# Patient Record
Sex: FEMALE | Race: WHITE | NOT HISPANIC OR LATINO | Employment: UNEMPLOYED | URBAN - METROPOLITAN AREA
[De-identification: names, ages, dates, MRNs, and addresses within clinical notes are randomized per-mention and may not be internally consistent; named-entity substitution may affect disease eponyms.]

---

## 2017-01-17 ENCOUNTER — ALLSCRIPTS OFFICE VISIT (OUTPATIENT)
Dept: OTHER | Facility: OTHER | Age: 37
End: 2017-01-17

## 2017-01-18 ENCOUNTER — LAB REQUISITION (OUTPATIENT)
Dept: LAB | Facility: HOSPITAL | Age: 37
End: 2017-01-18

## 2017-01-18 DIAGNOSIS — N76.6 ULCERATION OF VULVA: ICD-10-CM

## 2017-01-18 PROCEDURE — 87255 GENET VIRUS ISOLATE HSV: CPT | Performed by: NURSE PRACTITIONER

## 2017-01-18 PROCEDURE — 86694 HERPES SIMPLEX NES ANTBDY: CPT | Performed by: NURSE PRACTITIONER

## 2017-01-18 PROCEDURE — 86696 HERPES SIMPLEX TYPE 2 TEST: CPT | Performed by: NURSE PRACTITIONER

## 2017-01-18 PROCEDURE — 86695 HERPES SIMPLEX TYPE 1 TEST: CPT | Performed by: NURSE PRACTITIONER

## 2017-01-19 LAB
HSV1 IGG SER IA-ACNC: 50.1 INDEX (ref 0–0.9)
HSV1+2 IGM SER IA-ACNC: 3.54 RATIO (ref 0–0.9)
HSV2 IGG SER IA-ACNC: 10.5 INDEX (ref 0–0.9)

## 2017-01-20 LAB — HSV SPEC CULT: NORMAL

## 2017-01-31 ENCOUNTER — GENERIC CONVERSION - ENCOUNTER (OUTPATIENT)
Dept: OTHER | Facility: OTHER | Age: 37
End: 2017-01-31

## 2017-04-18 ENCOUNTER — ALLSCRIPTS OFFICE VISIT (OUTPATIENT)
Dept: OTHER | Facility: OTHER | Age: 37
End: 2017-04-18

## 2018-01-11 NOTE — PROGRESS NOTES
Assessment    1  Vulvar ulcer (856 50) (N76 6)    Plan  Vulvar ulcer    · ValACYclovir HCl - 1 GM Oral Tablet (Valtrex); TAKE 1 TABLET EVERY 12 HOURS   Rx By: Sarah Guillen; Dispense: 7 Days ; #:14 Tablet; Refill: 0; For: Vulvar ulcer; SOLE = N; Verified Transmission to [facility]; Last Updated By: System, [employer]; 1/17/2017 4:05:05 PM    Discussion/Summary  Discussion Summary:   Vulva ulceration cultured for hsv  rx for valtrex sent to pharmacy  blood drawn for hsv igg, igm   pt to be called when results available    rec  keep area clean, dry and tamika as much as possible  warm sitz's baths and advil prn pain relief  Has hx of surgery for abscesses; pt informed this is not an abscess now and will discuss in more detail when results available  all questions answered  call me if pain worsens or other sxs develop  GYN Discussion and Summary:       Herpes Simplex, Genital--  Chief Complaint  Chief Complaint Free Text Note Form: Pimple on groin area      History of Present Illness  HPI: Noticed painful area on vulva 3 days ago  In monog  relationship w  for 20+ years  hx of surgery for recurrent abscesses in the groin 10 years ago and no problems since until noticed this  States "not as bad as usual, wants to catch it early if needs antibiotic "  Vulvar Pain:   Robby Redman presents with complaints of gradual onset of moderate left vulvar pain, described as burning and stinging, non-radiating  Associated symptoms include vulvar soreness, vulvar irritation and vulvar tenderness, but no vulvar pruritus, no vulvar erythema, no vaginal discharge, no vaginal pain, no urethral discharge, no urinary changes, no dyspareunia, no abdominal pain, no bowel changes, no dysmenorrhea and no myalgias     The patient presents with complaints of vulvar lesions (3 small ulcerations seen on left labia majora that are c/w hsv  )      Review of Systems  Focused-Female: Constitutional: No fever, no chills, feels well, no tiredness, no recent weight gain or loss  ENT: no ear ache, no loss of hearing, no nosebleeds or nasal discharge, no sore throat or hoarseness  Cardiovascular: no complaints of slow or fast heart rate, no chest pain, no palpitations, no leg claudication or lower extremity edema  Respiratory: no complaints of shortness of breath, no wheezing, no dyspnea on exertion, no orthopnea or PND  Breasts: no complaints of breast pain, breast lump or nipple discharge  Gastrointestinal: no complaints of abdominal pain, no constipation, no nausea or diarrhea, no vomiting, no bloody stools  Genitourinary: no complaints of dysuria, no incontinence, no pelvic pain, no dysmenorrhea, no vaginal discharge or abnormal vaginal bleeding and as noted in HPI  Musculoskeletal: no complaints of arthralgia, no myalgia, no joint swelling or stiffness, no limb pain or swelling  Integumentary: no complaints of skin rash or lesion, no itching or dry skin, no skin wounds  Neurological: no complaints of headache, no confusion, no numbness or tingling, no dizziness or fainting  ROS Reviewed:   ROS reviewed  Active Problems    1  Encounter for routine gynecological examination (V72 31) (Z01 419)   2  Pain, female pelvic (625 9) (R10 2)    Past Medical History  Active Problems And Past Medical History Reviewed: The active problems and past medical history were reviewed and updated today  Surgical History  Surgical History Reviewed: The surgical history was reviewed and updated today  Family History  Family History Reviewed: The family history was reviewed and updated today  Social History  Social History Reviewed: The social history was reviewed and updated today  The social history was reviewed and is unchanged  Current Meds  Medication List Reviewed: The medication list was reviewed and updated today  Allergies    1   No Known Drug Allergies    Vitals  Vital Signs    Recorded: [id number] 89:81GR   Systolic 169   Diastolic 68   Weight 022 lb 3 04 oz     Physical Exam    Constitutional   General appearance: No acute distress, well appearing and well nourished  Pulmonary   Respiratory effort: No increased work of breathing or signs of respiratory distress  Genitourinary   External genitalia: Abnormal   (3 small ulceration c/w w hsv on left labia majora)   Urethra: Normal     Urethral meatus: Normal     Psychiatric   Orientation to person, place, and time: Normal     Mood and affect: Normal        Signatures   Electronically signed by :  Mindy Mercado; Jan 17 2017  4:18PM EST                       (Author)    Electronically signed by : LANDEN Koehler ; Jan 19 2017  9:46AM EST

## 2018-01-13 VITALS — DIASTOLIC BLOOD PRESSURE: 68 MMHG | SYSTOLIC BLOOD PRESSURE: 118 MMHG | WEIGHT: 187.19 LBS

## 2018-01-14 VITALS
WEIGHT: 192.13 LBS | SYSTOLIC BLOOD PRESSURE: 114 MMHG | DIASTOLIC BLOOD PRESSURE: 68 MMHG | HEIGHT: 69 IN | BODY MASS INDEX: 28.46 KG/M2

## 2018-01-16 NOTE — MISCELLANEOUS
Message   Recorded as Task   Date: 01/24/2017 09:00 AM, Created By: Leno Hurtado   Task Name: Follow Up   Assigned To: Bo Thurman   Regarding Patient: Fede Diaz, Status: Active   Comment:    Apurva Garcia - 24 Jan 2017 9:00 AM     TASK CREATED  pl call and tell her:      labs were + for type 1 and type 2 hsv, which means her body has been exposed to both of them  her culture was negative, but not unexpected b/c I didn't get much fluid from the area  If she ever gets a sore like she did before, come in when it is present, or looks like a blister so it can be cultured and then they can identify which type it is  We will make a apt for her to be seen the day she has the lesion  thanks   Bo Thurman - 25 Jan 2017 11:06 AM     TASK REPLIED TO: Previously Assigned To [facility],Team                      tried calling pt but number on file was not accepting phone calls  will try another time   Leydi Hernandez - 30 Jan 2017 4:44 PM     TASK REASSIGNED: Previously Assigned To [facility],Team  number on file still not in service  will send her a letter   Please see task below      Active Problems    1  Encounter for routine gynecological examination (V72 31) (Z01 419)   2  Pain, female pelvic (625 9) (R10 2)   3  Vulvar ulcer (616 50) (N76 6)    Current Meds   1  ValACYclovir HCl - 1 GM Oral Tablet; TAKE 1 TABLET EVERY 12 HOURS; Therapy: [id number] to (Evaluate:24Jan2017)  Requested for: [id number]; Last   Rx:17Jan2017 Ordered    Allergies    1  No Known Drug Allergies    Signatures   Electronically signed by :  PAPI Urena; Feb 1 2017 12:59PM EST                       (Author)

## 2021-01-09 ENCOUNTER — IMMUNIZATIONS (OUTPATIENT)
Dept: FAMILY MEDICINE CLINIC | Facility: HOSPITAL | Age: 41
End: 2021-01-09

## 2021-01-09 DIAGNOSIS — Z23 ENCOUNTER FOR IMMUNIZATION: ICD-10-CM

## 2021-01-09 PROCEDURE — 91301 SARS-COV-2 / COVID-19 MRNA VACCINE (MODERNA) 100 MCG: CPT

## 2021-01-09 PROCEDURE — 0011A SARS-COV-2 / COVID-19 MRNA VACCINE (MODERNA) 100 MCG: CPT

## 2021-02-04 ENCOUNTER — IMMUNIZATIONS (OUTPATIENT)
Dept: FAMILY MEDICINE CLINIC | Facility: HOSPITAL | Age: 41
End: 2021-02-04

## 2021-02-04 DIAGNOSIS — Z23 ENCOUNTER FOR IMMUNIZATION: Primary | ICD-10-CM

## 2021-02-04 PROCEDURE — 0012A SARS-COV-2 / COVID-19 MRNA VACCINE (MODERNA) 100 MCG: CPT

## 2021-02-04 PROCEDURE — 91301 SARS-COV-2 / COVID-19 MRNA VACCINE (MODERNA) 100 MCG: CPT

## 2021-08-07 ENCOUNTER — APPOINTMENT (EMERGENCY)
Dept: CT IMAGING | Facility: HOSPITAL | Age: 41
End: 2021-08-07
Payer: COMMERCIAL

## 2021-08-07 ENCOUNTER — HOSPITAL ENCOUNTER (EMERGENCY)
Facility: HOSPITAL | Age: 41
Discharge: HOME | End: 2021-08-08
Attending: EMERGENCY MEDICINE | Admitting: EMERGENCY MEDICINE
Payer: COMMERCIAL

## 2021-08-07 DIAGNOSIS — N12 PYELONEPHRITIS: Primary | ICD-10-CM

## 2021-08-07 LAB
ALBUMIN SERPL BCP-MCNC: 3.7 G/DL (ref 3.5–5)
ALP SERPL-CCNC: 46 U/L (ref 46–116)
ALT SERPL W P-5'-P-CCNC: 25 U/L (ref 12–78)
ANION GAP SERPL CALCULATED.3IONS-SCNC: 10 MMOL/L (ref 4–13)
APTT PPP: 30 SECONDS (ref 23–37)
AST SERPL W P-5'-P-CCNC: 15 U/L (ref 5–45)
BACTERIA UR QL AUTO: ABNORMAL /HPF
BASOPHILS # BLD AUTO: 0.03 THOUSANDS/ΜL (ref 0–0.1)
BASOPHILS NFR BLD AUTO: 0 % (ref 0–1)
BILIRUB SERPL-MCNC: 0.28 MG/DL (ref 0.2–1)
BILIRUB UR QL STRIP: NEGATIVE
BUN SERPL-MCNC: 12 MG/DL (ref 5–25)
CALCIUM SERPL-MCNC: 8.6 MG/DL (ref 8.3–10.1)
CHLORIDE SERPL-SCNC: 103 MMOL/L (ref 100–108)
CLARITY UR: ABNORMAL
CO2 SERPL-SCNC: 28 MMOL/L (ref 21–32)
COLOR UR: YELLOW
CREAT SERPL-MCNC: 0.92 MG/DL (ref 0.6–1.3)
EOSINOPHIL # BLD AUTO: 0.05 THOUSAND/ΜL (ref 0–0.61)
EOSINOPHIL NFR BLD AUTO: 1 % (ref 0–6)
ERYTHROCYTE [DISTWIDTH] IN BLOOD BY AUTOMATED COUNT: 13 % (ref 11.6–15.1)
EXT PREG TEST URINE: NEGATIVE
EXT. CONTROL ED NAV: NORMAL
GFR SERPL CREATININE-BSD FRML MDRD: 77 ML/MIN/1.73SQ M
GLUCOSE SERPL-MCNC: 91 MG/DL (ref 65–140)
GLUCOSE UR STRIP-MCNC: NEGATIVE MG/DL
HCT VFR BLD AUTO: 37.7 % (ref 34.8–46.1)
HGB BLD-MCNC: 12.8 G/DL (ref 11.5–15.4)
HGB UR QL STRIP.AUTO: ABNORMAL
IMM GRANULOCYTES # BLD AUTO: 0.03 THOUSAND/UL (ref 0–0.2)
IMM GRANULOCYTES NFR BLD AUTO: 0 % (ref 0–2)
INR PPP: 1.05 (ref 0.84–1.19)
KETONES UR STRIP-MCNC: NEGATIVE MG/DL
LACTATE SERPL-SCNC: 1 MMOL/L (ref 0.5–2)
LEUKOCYTE ESTERASE UR QL STRIP: ABNORMAL
LIPASE SERPL-CCNC: 90 U/L (ref 73–393)
LYMPHOCYTES # BLD AUTO: 0.68 THOUSANDS/ΜL (ref 0.6–4.47)
LYMPHOCYTES NFR BLD AUTO: 9 % (ref 14–44)
MCH RBC QN AUTO: 30.8 PG (ref 26.8–34.3)
MCHC RBC AUTO-ENTMCNC: 34 G/DL (ref 31.4–37.4)
MCV RBC AUTO: 91 FL (ref 82–98)
MONOCYTES # BLD AUTO: 0.57 THOUSAND/ΜL (ref 0.17–1.22)
MONOCYTES NFR BLD AUTO: 7 % (ref 4–12)
NEUTROPHILS # BLD AUTO: 6.32 THOUSANDS/ΜL (ref 1.85–7.62)
NEUTS SEG NFR BLD AUTO: 83 % (ref 43–75)
NITRITE UR QL STRIP: NEGATIVE
NON-SQ EPI CELLS URNS QL MICRO: ABNORMAL /HPF
NRBC BLD AUTO-RTO: 0 /100 WBCS
PH UR STRIP.AUTO: 6.5 [PH] (ref 4.5–8)
PLATELET # BLD AUTO: 244 THOUSANDS/UL (ref 149–390)
PMV BLD AUTO: 10 FL (ref 8.9–12.7)
POTASSIUM SERPL-SCNC: 3.7 MMOL/L (ref 3.5–5.3)
PROT SERPL-MCNC: 7 G/DL (ref 6.4–8.2)
PROT UR STRIP-MCNC: ABNORMAL MG/DL
PROTHROMBIN TIME: 13.5 SECONDS (ref 11.6–14.5)
RBC # BLD AUTO: 4.16 MILLION/UL (ref 3.81–5.12)
RBC #/AREA URNS AUTO: ABNORMAL /HPF
SODIUM SERPL-SCNC: 141 MMOL/L (ref 136–145)
SP GR UR STRIP.AUTO: 1.01 (ref 1–1.03)
UROBILINOGEN UR QL STRIP.AUTO: 0.2 E.U./DL
WBC # BLD AUTO: 7.68 THOUSAND/UL (ref 4.31–10.16)
WBC #/AREA URNS AUTO: ABNORMAL /HPF

## 2021-08-07 PROCEDURE — 96365 THER/PROPH/DIAG IV INF INIT: CPT

## 2021-08-07 PROCEDURE — 87040 BLOOD CULTURE FOR BACTERIA: CPT | Performed by: EMERGENCY MEDICINE

## 2021-08-07 PROCEDURE — 81001 URINALYSIS AUTO W/SCOPE: CPT

## 2021-08-07 PROCEDURE — 96375 TX/PRO/DX INJ NEW DRUG ADDON: CPT

## 2021-08-07 PROCEDURE — 87186 SC STD MICRODIL/AGAR DIL: CPT

## 2021-08-07 PROCEDURE — 83605 ASSAY OF LACTIC ACID: CPT | Performed by: EMERGENCY MEDICINE

## 2021-08-07 PROCEDURE — 87077 CULTURE AEROBIC IDENTIFY: CPT

## 2021-08-07 PROCEDURE — 74177 CT ABD & PELVIS W/CONTRAST: CPT

## 2021-08-07 PROCEDURE — 87086 URINE CULTURE/COLONY COUNT: CPT

## 2021-08-07 PROCEDURE — 96366 THER/PROPH/DIAG IV INF ADDON: CPT

## 2021-08-07 PROCEDURE — 80053 COMPREHEN METABOLIC PANEL: CPT | Performed by: EMERGENCY MEDICINE

## 2021-08-07 PROCEDURE — 81025 URINE PREGNANCY TEST: CPT | Performed by: EMERGENCY MEDICINE

## 2021-08-07 PROCEDURE — 99284 EMERGENCY DEPT VISIT MOD MDM: CPT | Performed by: EMERGENCY MEDICINE

## 2021-08-07 PROCEDURE — 84145 PROCALCITONIN (PCT): CPT | Performed by: EMERGENCY MEDICINE

## 2021-08-07 PROCEDURE — 85025 COMPLETE CBC W/AUTO DIFF WBC: CPT | Performed by: EMERGENCY MEDICINE

## 2021-08-07 PROCEDURE — 36415 COLL VENOUS BLD VENIPUNCTURE: CPT | Performed by: EMERGENCY MEDICINE

## 2021-08-07 PROCEDURE — 85730 THROMBOPLASTIN TIME PARTIAL: CPT | Performed by: EMERGENCY MEDICINE

## 2021-08-07 PROCEDURE — 83690 ASSAY OF LIPASE: CPT | Performed by: EMERGENCY MEDICINE

## 2021-08-07 PROCEDURE — 85610 PROTHROMBIN TIME: CPT | Performed by: EMERGENCY MEDICINE

## 2021-08-07 PROCEDURE — 99284 EMERGENCY DEPT VISIT MOD MDM: CPT

## 2021-08-07 RX ORDER — KETOROLAC TROMETHAMINE 30 MG/ML
15 INJECTION, SOLUTION INTRAMUSCULAR; INTRAVENOUS ONCE
Status: COMPLETED | OUTPATIENT
Start: 2021-08-07 | End: 2021-08-07

## 2021-08-07 RX ADMIN — SODIUM CHLORIDE, SODIUM LACTATE, POTASSIUM CHLORIDE, AND CALCIUM CHLORIDE 2000 ML: .6; .31; .03; .02 INJECTION, SOLUTION INTRAVENOUS at 21:53

## 2021-08-07 RX ADMIN — KETOROLAC TROMETHAMINE 15 MG: 30 INJECTION, SOLUTION INTRAMUSCULAR; INTRAVENOUS at 21:57

## 2021-08-07 RX ADMIN — IOHEXOL 100 ML: 350 INJECTION, SOLUTION INTRAVENOUS at 22:54

## 2021-08-08 VITALS
HEART RATE: 84 BPM | RESPIRATION RATE: 16 BRPM | WEIGHT: 173.28 LBS | TEMPERATURE: 99 F | OXYGEN SATURATION: 100 % | BODY MASS INDEX: 25.59 KG/M2 | DIASTOLIC BLOOD PRESSURE: 62 MMHG | SYSTOLIC BLOOD PRESSURE: 102 MMHG

## 2021-08-08 LAB — PROCALCITONIN SERPL-MCNC: 0.12 NG/ML

## 2021-08-08 PROCEDURE — 96367 TX/PROPH/DG ADDL SEQ IV INF: CPT

## 2021-08-08 RX ORDER — SULFAMETHOXAZOLE AND TRIMETHOPRIM 800; 160 MG/1; MG/1
1 TABLET ORAL 2 TIMES DAILY
Qty: 20 TABLET | Refills: 0 | Status: SHIPPED | OUTPATIENT
Start: 2021-08-08 | End: 2021-08-18

## 2021-08-08 RX ADMIN — CEFTRIAXONE SODIUM 1000 MG: 10 INJECTION, POWDER, FOR SOLUTION INTRAVENOUS at 00:07

## 2021-08-08 NOTE — ED PROVIDER NOTES
History  Chief Complaint   Patient presents with    Flank Pain     Reports L sided flank pain since Wednesday  Cloudy urine x 2 weeks  + chills  This is a 23-year-old female with a history of migraines who presents with flank pain  Over the past couple of weeks, the patient has noticed cloudy urine  Patient states that she started taking a lot of vitamins, so believes that the cloudy urine was due to the vitamins  However, starting on Wednesday, the patient has noticed bilateral flank pain which has been constant, nonradiating associated with chills  She has been taking Motrin since yesterday  Last took Motrin at around 5:30 p m  Vikas Irvin Denies any sick contacts  Does admit to urinary frequency, but states that this is typical for her  Denies any history of kidney stones, gallstones, alcohol abuse  No history of abdominal surgeries  Denies fever, nausea/vomiting, lightheadedness/dizziness, numbness/weakness, headache, change in vision, URI symptoms, neck pain, chest pain, palpitations, shortness of breath, cough, back pain, abdominal pain, diarrhea, hematochezia, melena, dysuria, hematuria, abnormal vaginal discharge/bleeding  None       History reviewed  No pertinent past medical history  History reviewed  No pertinent surgical history  History reviewed  No pertinent family history  I have reviewed and agree with the history as documented  E-Cigarette/Vaping     E-Cigarette/Vaping Substances     Social History     Tobacco Use    Smoking status: Never Smoker    Smokeless tobacco: Never Used   Substance Use Topics    Alcohol use: Never    Drug use: Not on file       Review of Systems   Constitutional: Positive for chills  Negative for fever  HENT: Negative for rhinorrhea, sore throat and trouble swallowing  Eyes: Negative for photophobia and visual disturbance  Respiratory: Negative for cough, chest tightness and shortness of breath      Cardiovascular: Negative for chest pain, palpitations and leg swelling  Gastrointestinal: Negative for abdominal pain, blood in stool, diarrhea, nausea and vomiting  Endocrine: Negative for polyuria  Genitourinary: Positive for flank pain  Negative for dysuria, hematuria, vaginal bleeding and vaginal discharge  Musculoskeletal: Negative for back pain and neck pain  Skin: Negative for color change and rash  Allergic/Immunologic: Negative for immunocompromised state  Neurological: Negative for dizziness, weakness, light-headedness, numbness and headaches  All other systems reviewed and are negative  Physical Exam  Physical Exam  Constitutional:       General: She is not in acute distress  Appearance: Normal appearance  She is well-developed  HENT:      Mouth/Throat:      Pharynx: Uvula midline  Eyes:      Conjunctiva/sclera: Conjunctivae normal       Pupils: Pupils are equal, round, and reactive to light  Neck:      Thyroid: No thyroid mass or thyromegaly  Trachea: Trachea normal    Cardiovascular:      Rate and Rhythm: Regular rhythm  Tachycardia present  Heart sounds: Normal heart sounds  No murmur heard  Pulmonary:      Effort: Pulmonary effort is normal       Breath sounds: Normal breath sounds  Abdominal:      General: Bowel sounds are normal       Palpations: Abdomen is soft  Tenderness: There is abdominal tenderness in the right upper quadrant  There is no guarding or rebound  Skin:     General: Skin is warm and dry  Neurological:      Mental Status: She is alert  Psychiatric:         Speech: Speech normal          Behavior: Behavior normal  Behavior is cooperative  Thought Content:  Thought content normal          Vital Signs  ED Triage Vitals [08/07/21 2120]   Temperature Pulse Respirations Blood Pressure SpO2   99 °F (37 2 °C) (!) 125 20 117/72 97 %      Temp Source Heart Rate Source Patient Position - Orthostatic VS BP Location FiO2 (%)   Oral Monitor Sitting Right arm --      Pain Score       --           Vitals:    08/07/21 2120 08/07/21 2300 08/07/21 2345 08/08/21 0015   BP: 117/72  104/69 102/62   Pulse: (!) 125 92 86 84   Patient Position - Orthostatic VS: Sitting  Lying Lying         Visual Acuity      ED Medications  Medications   lactated ringers bolus 2,000 mL (0 mL Intravenous Stopped 8/8/21 0005)   ketorolac (TORADOL) injection 15 mg (15 mg Intravenous Given 8/7/21 2157)   iohexol (OMNIPAQUE) 350 MG/ML injection (SINGLE-DOSE) 100 mL (100 mL Intravenous Given 8/7/21 2254)   ceftriaxone (ROCEPHIN) 1 g/50 mL in dextrose IVPB (0 mg Intravenous Stopped 8/8/21 0102)       Diagnostic Studies  Results Reviewed     Procedure Component Value Units Date/Time    Comprehensive metabolic panel [[id number]] Collected: 08/07/21 2147    Lab Status: Final result Specimen: Blood from Arm, Right Updated: 08/07/21 2225     Sodium 141 mmol/L      Potassium 3 7 mmol/L      Chloride 103 mmol/L      CO2 28 mmol/L      ANION GAP 10 mmol/L      BUN 12 mg/dL      Creatinine 0 92 mg/dL      Glucose 91 mg/dL      Calcium 8 6 mg/dL      AST 15 U/L      ALT 25 U/L      Alkaline Phosphatase 46 U/L      Total Protein 7 0 g/dL      Albumin 3 7 g/dL      Total Bilirubin 0 28 mg/dL      eGFR 77 ml/min/1 73sq m     Narrative:      [place] guidelines for Chronic Kidney Disease (CKD):     Stage 1 with normal or high GFR (GFR > 90 mL/min/1 73 square meters)    Stage 2 Mild CKD (GFR = 60-89 mL/min/1 73 square meters)    Stage 3A Moderate CKD (GFR = 45-59 mL/min/1 73 square meters)    Stage 3B Moderate CKD (GFR = 30-44 mL/min/1 73 square meters)    Stage 4 Severe CKD (GFR = 15-29 mL/min/1 73 square meters)    Stage 5 End Stage CKD (GFR <15 mL/min/1 73 square meters)  Note: GFR calculation is accurate only with a steady state creatinine    Lipase [[id number]]  (Normal) Collected: 08/07/21 2147    Lab Status: Final result Specimen: Blood from Arm, Right Updated: 08/07/21 2225     Lipase 90 u/L Urine Microscopic [[id number]]  (Abnormal) Collected: 08/07/21 2150    Lab Status: Final result Specimen: Urine, Clean Catch Updated: 08/07/21 2218     RBC, UA 4-10 /hpf      WBC, UA Innumerable /hpf      Epithelial Cells Occasional /hpf      Bacteria, UA Occasional /hpf     Urine culture [[id number]] Collected: 08/07/21 2150    Lab Status: In process Specimen: Urine, Clean Catch Updated: 08/07/21 2217    Lactic Acid [[id number]]  (Normal) Collected: 08/07/21 2150    Lab Status: Final result Specimen: Blood from Arm, Left Updated: 08/07/21 2216     LACTIC ACID 1 0 mmol/L     Narrative:      Result may be elevated if tourniquet was used during collection  Protime-INR [[id number]]  (Normal) Collected: 08/07/21 2146    Lab Status: Final result Specimen: Blood from Arm, Right Updated: 08/07/21 2215     Protime 13 5 seconds      INR 1 05    APTT [[id number]]  (Normal) Collected: 08/07/21 2146    Lab Status: Final result Specimen: Blood from Arm, Right Updated: 08/07/21 2215     PTT 30 seconds     CBC and differential [[id number]]  (Abnormal) Collected: 08/07/21 2147    Lab Status: Final result Specimen: Blood from Arm, Right Updated: 08/07/21 2200     WBC 7 68 Thousand/uL      RBC 4 16 Million/uL      Hemoglobin 12 8 g/dL      Hematocrit 37 7 %      MCV 91 fL      MCH 30 8 pg      MCHC 34 0 g/dL      RDW 13 0 %      MPV 10 0 fL      Platelets 337 Thousands/uL      nRBC 0 /100 WBCs      Neutrophils Relative 83 %      Immat GRANS % 0 %      Lymphocytes Relative 9 %      Monocytes Relative 7 %      Eosinophils Relative 1 %      Basophils Relative 0 %      Neutrophils Absolute 6 32 Thousands/µL      Immature Grans Absolute 0 03 Thousand/uL      Lymphocytes Absolute 0 68 Thousands/µL      Monocytes Absolute 0 57 Thousand/µL      Eosinophils Absolute 0 05 Thousand/µL      Basophils Absolute 0 03 Thousands/µL     Procalcitonin with AM Reflex [[id number]] Collected: 08/07/21 2147    Lab Status:  In process Specimen: Blood from Arm, Right Updated: 08/07/21 2157    Blood culture #1 [[id number]] Collected: 08/07/21 2148    Lab Status: In process Specimen: Blood from Arm, Right Updated: 08/07/21 2156    Blood culture #2 [[id number]] Collected: 08/07/21 2151    Lab Status: In process Specimen: Blood from Arm, Left Updated: 08/07/21 2156    POCT pregnancy, urine [[id number]]  (Normal) Resulted: 08/07/21 2152    Lab Status: Final result Updated: 08/07/21 2153     EXT PREG TEST UR (Ref: Negative) negative     Control valid    Urine Macroscopic, POC [[id number]]  (Abnormal) Collected: 08/07/21 2150    Lab Status: Final result Specimen: Urine Updated: 08/07/21 2151     Color, UA Yellow     Clarity, UA Cloudy     pH, UA 6 5     Leukocytes, UA Large     Nitrite, UA Negative     Protein, UA 30 (1+) mg/dl      Glucose, UA Negative mg/dl      Ketones, UA Negative mg/dl      Urobilinogen, UA 0 2 E U /dl      Bilirubin, UA Negative     Blood, UA Moderate     Specific McClure, UA 1 015    Narrative:      CLINITEK RESULT                 CT abdomen pelvis with contrast   Final Result by Shaka Benedict MD (08/08 0120)      Findings as above compatible with urinary tract infection with suspect acute focal pyelonephritis in the right upper pole and cystitis  Correlate with clinical history and laboratory values  No evidence of obstructing calculus/hydronephrosis  Workstation performed: [id number]                    Procedures  Procedures         ED Course                             SBIRT 20yo+      Most Recent Value   SBIRT (22 yo +)   In order to provide better care to our patients, we are screening all of our patients for alcohol and drug use  Would it be okay to ask you these screening questions? No Filed at: 08/07/2021 2134                    MDM  Number of Diagnoses or Management Options  Diagnosis management comments: Will check labs, urinalysis with urine pregnancy  CT abdomen/pelvis with contrast   Aggressive fluid hydration  Toradol    Disposition pending results  Disposition  Final diagnoses:   Pyelonephritis     Time reflects when diagnosis was documented in both MDM as applicable and the Disposition within this note     Time User Action Codes Description Comment    8/8/2021  1:22 AM Aleshia Fung Lonny [N12] Pyelonephritis       ED Disposition     ED Disposition Condition Date/Time Comment    Discharge Stable Sun Aug 8, 2021  1:22 AM Ashley Washburnzeynep discharge to home/self care  Follow-up Information     Follow up With Specialties Details Why [address] [place] [address] Emergency Department Emergency Medicine Go to  If symptoms worsen [facility] [id number]  [address] [place] [address] Emergency Department, [address] [place] [address], [phone]          Patient's Medications   Discharge Prescriptions    SULFAMETHOXAZOLE-TRIMETHOPRIM (BACTRIM DS) 800-160 MG PER TABLET    Take 1 tablet by mouth 2 (two) times a day for 10 days smx-tmp DS (BACTRIM) 800-160 mg tabs (1tab q12 D10)       Start Date: 8/8/2021  End Date: 8/18/2021       Order Dose: 1 tablet       Quantity: 20 tablet    Refills: 0     No discharge procedures on file      PDMP Review     None          ED Provider  Electronically Signed by           Shoshana Bowens MD  08/08/21 6823

## 2021-08-10 LAB — BACTERIA UR CULT: ABNORMAL

## 2021-08-13 LAB
BACTERIA BLD CULT: NORMAL
BACTERIA BLD CULT: NORMAL

## 2024-03-26 RX ORDER — RIMEGEPANT SULFATE 75 MG/75MG
75 TABLET, ORALLY DISINTEGRATING ORAL
COMMUNITY
Start: 2024-01-11

## 2024-03-27 ENCOUNTER — OFFICE VISIT (OUTPATIENT)
Dept: BARIATRICS | Facility: CLINIC | Age: 44
End: 2024-03-27
Payer: COMMERCIAL

## 2024-03-27 ENCOUNTER — TELEPHONE (OUTPATIENT)
Age: 44
End: 2024-03-27

## 2024-03-27 VITALS
DIASTOLIC BLOOD PRESSURE: 91 MMHG | HEART RATE: 75 BPM | WEIGHT: 197.2 LBS | BODY MASS INDEX: 29.89 KG/M2 | SYSTOLIC BLOOD PRESSURE: 130 MMHG | TEMPERATURE: 98.5 F | HEIGHT: 68 IN

## 2024-03-27 DIAGNOSIS — E66.3 OVERWEIGHT: Primary | ICD-10-CM

## 2024-03-27 DIAGNOSIS — E78.5 HYPERLIPIDEMIA: ICD-10-CM

## 2024-03-27 PROCEDURE — 99204 OFFICE O/P NEW MOD 45 MIN: CPT | Performed by: PHYSICIAN ASSISTANT

## 2024-03-27 NOTE — PROGRESS NOTES
Assessment/Plan:    Hyperlipidemia  LDL cholesterol of 178.  -should improve with weight loss, dietary, and lifestyle changes      Overweight  -Discussed options of HealthyCORE-Intensive Lifestyle Intervention Program, Very Low Calorie Diet-VLCD, and Conservative Program and the role of weight loss medications.Explained the importance of making lifestyle changes if utilizing medication to aid in weight loss  -Initial weight loss goal of 5-10% weight loss for improved health  -Screening labs and records reviewed from prior  - STOP BANG-0/8    -Patient is interested in pursuing Conservative Program  -Calorie goals, sample menu (6136-4699), portion size guidelines, and food logging reviewed with the patient.     Goals:  -Food log (ie.) [email or web address]  - To drink at least 64oz of water daily.No sugary beverages.  -discussed trying to increase exercise-recommend seated activities or wall pilates    To start on wegovy. They have tried more than 6 months of lifestyle modifications including diet and activity changes and has had insignificant weight loss of less than 1 lb a week. Patient denies personal and family history of MCT and MEN2 tumors. Patient denies personal history of pancreatitis. Side effects discussed but not limited to diarrhea, bloating, constipation, GI upset, heartburn, increased heart rate, headache, low blood sugar, fatigue and dizziness. Titration and medication administration discussed. Recommend avoiding fasting as she would be at risk for malnutrition and hypoglycemia  Medication agreement signed    Initial Weight:197.2  Goal Weight:155lb        Follow up in approximately  6 week nurse visit and 4 months  with Non-Surgical Physician/Advanced Practitioner.  I have spent a total time of 35 minutes on 03/27/24 in caring for this patient including Diagnostic results, Prognosis, Risks and benefits of tx options, Instructions for management, Patient and family  education, Importance of tx compliance, Risk factor reductions, Impressions, Counseling / Coordination of care, Documenting in the medical record, Reviewing / ordering tests, medicine, procedures  , and Obtaining or reviewing history  .      Diagnoses and all orders for this visit:    Overweight  -     Semaglutide-Weight Management (WEGOVY) 0.25 MG/0.5ML; Inject 0.5 mL (0.25 mg total) under the skin once a week    Hyperlipidemia  -     Semaglutide-Weight Management (WEGOVY) 0.25 MG/0.5ML; Inject 0.5 mL (0.25 mg total) under the skin once a week    Other orders  -     rimegepant sulfate (Nurtec) 75 mg TBDP; Take 75 mg by mouth          Subjective:   Chief Complaint   Patient presents with    Consult     New Pt  MWM - Consult  goal Wt   155 lb  waist   32,  S/B 0/8        Patient ID: Ashley Cantu  is a 43 y.o. female with excess weight/obesity here to pursue weight management.    History reviewed. No pertinent past medical history.    HPI: Here for MWM consult    Noticed weight gain over the last 3-4 years. Noticed more sugery food cravings.      She did do atkins and isagenix diet prior.  Did isagenix for 3 years .  She retried it but it has not helped.  She does feel she is more hungry at night     She was on emgality in the past.  She stopped in July .     She is fasting from 7PM -11PM for both weight loss and [employer] reason.  She has been doing that prior to the last 2 weeks.  Typically eating soup, salad and protein.      She was prescribed topamax prior for weight loss and migraines and it didn't help    Obesity/Excess Weight:  Severity:  overweight with HLD  Onset:  3-4 years    Modifiers: Diet and Exercise  Contributing factors:  unsure, currently starting to be perimenopausal    Hydration:water more than 60 oz normally  Alcohol: none  Exercise:irregular, was doing elipitcal but has had achilles pain  Occupation:sit/stand with dental practice  Sleep:  Dining out/takeout:1 x week     Diet Recall:  7AM:  "tea  9AM: shake:  11AM: 100-150 alicia snack-boiled egg or deangelo with hummus  12PM: 400 alicia-meat or salad or shake    6PM :meal    48 hour fast weekly   The following portions of the patient's history were reviewed and updated as appropriate: She  has no past medical history on file.  She   Patient Active Problem List    Diagnosis Date Noted    Hyperlipidemia 03/27/2024    Overweight 03/27/2024    Migraine without aura 02/13/2015     She  has a past surgical history that includes Strabismus surgery and AUGMENTATION BREAST.  Her family history includes Thyroid disease in her mother.  She  reports that she has never smoked. She has never used smokeless tobacco. She reports that she does not drink alcohol. No history on file for drug use.  Current Outpatient Medications   Medication Sig Dispense Refill    rimegepant sulfate (Nurtec) 75 mg TBDP Take 75 mg by mouth      Semaglutide-Weight Management (WEGOVY) 0.25 MG/0.5ML Inject 0.5 mL (0.25 mg total) under the skin once a week 2 mL 0     No current facility-administered medications for this visit.     Current Outpatient Medications on File Prior to Visit   Medication Sig    rimegepant sulfate (Nurtec) 75 mg TBDP Take 75 mg by mouth     No current facility-administered medications on file prior to visit.   .    Review of Systems   Constitutional:  Negative for fever.   Respiratory:  Negative for shortness of breath.    Cardiovascular:  Negative for chest pain and palpitations.   Gastrointestinal:  Negative for abdominal pain, constipation, diarrhea and vomiting.   Genitourinary:  Negative for difficulty urinating.   Skin:  Negative for rash.   Neurological:  Negative for headaches.   Psychiatric/Behavioral:  Negative for dysphoric mood. The patient is not nervous/anxious.        Objective:    /91   Pulse 75   Temp 98.5 °F (36.9 °C) (Tympanic)   Ht 5' 8\" (1.727 m)   Wt 89.4 kg (197 lb 3.2 oz)   BMI 29.98 kg/m²     Physical Exam  Vitals and nursing note reviewed. "   Constitutional:       General: She is not in acute distress.     Appearance: She is well-developed.      Comments: Overweight     HENT:      Head: Normocephalic and atraumatic.   Eyes:      Conjunctiva/sclera: Conjunctivae normal.   Neck:      Thyroid: No thyromegaly.   Pulmonary:      Effort: Pulmonary effort is normal. No respiratory distress.   Skin:     Findings: No rash (visible).   Neurological:      Mental Status: She is alert and oriented to person, place, and time.   Psychiatric:         Behavior: Behavior normal.

## 2024-03-27 NOTE — ASSESSMENT & PLAN NOTE
-Discussed options of [facility]-Intensive Lifestyle Intervention Program, Very Low Calorie Diet-VLCD, and Conservative Program and the role of weight loss medications.Explained the importance of making lifestyle changes if utilizing medication to aid in weight loss  -Initial weight loss goal of 5-10% weight loss for improved health  -Screening labs and records reviewed from prior  - STOP BANG-0/8    -Patient is interested in pursuing Conservative Program  -Calorie goals, sample menu ([id number]), portion size guidelines, and food logging reviewed with the patient.     Goals:  -Food log (ie.) www.[employer],"[employer]-1100  - To drink at least 64oz of water daily.No sugary beverages.  -discussed trying to increase exercise-recommend seated activities or wall pilates    To start on wegovy. They have tried more than 6 months of lifestyle modifications including diet and activity changes and has had insignificant weight loss of less than 1 lb a week. Patient denies personal and family history of MCT and MEN2 tumors. Patient denies personal history of pancreatitis. Side effects discussed but not limited to diarrhea, bloating, constipation, GI upset, heartburn, increased heart rate, headache, low blood sugar, fatigue and dizziness. Titration and medication administration discussed. Recommend avoiding fasting as she would be at risk for malnutrition and hypoglycemia  Medication agreement signed    Initial Weight:197.2  Goal Weight:155lb

## 2024-04-02 NOTE — TELEPHONE ENCOUNTER
PA for wegovy    Submitted via    [x]CMM-KEY [id number]  []Surescripts-Case ID #    []Faxed to plan   []Other website    []Phone call Case ID #      Office notes sent, clinical questions answered. Awaiting determination    Turnaround time for your insurance to make a decision on your Prior Authorization can take 7-21 business days.

## 2024-04-10 ENCOUNTER — TELEPHONE (OUTPATIENT)
Age: 44
End: 2024-04-10

## 2024-04-10 DIAGNOSIS — E66.3 OVERWEIGHT: ICD-10-CM

## 2024-04-10 DIAGNOSIS — E78.5 HYPERLIPIDEMIA: ICD-10-CM

## 2024-04-10 NOTE — TELEPHONE ENCOUNTER
I had prescribed her wegovy.  I just want to check if the zepbound is a mistake or if she wanted zepbound instead of the wegovy

## 2024-05-09 ENCOUNTER — TELEPHONE (OUTPATIENT)
Dept: BARIATRICS | Facility: CLINIC | Age: 44
End: 2024-05-09

## 2024-05-09 ENCOUNTER — CLINICAL SUPPORT (OUTPATIENT)
Dept: BARIATRICS | Facility: CLINIC | Age: 44
End: 2024-05-09

## 2024-05-09 VITALS
DIASTOLIC BLOOD PRESSURE: 70 MMHG | BODY MASS INDEX: 28.58 KG/M2 | TEMPERATURE: 97.2 F | SYSTOLIC BLOOD PRESSURE: 102 MMHG | WEIGHT: 188.6 LBS | RESPIRATION RATE: 16 BRPM | HEIGHT: 68 IN | HEART RATE: 85 BPM

## 2024-05-09 DIAGNOSIS — E78.2 MODERATE MIXED HYPERLIPIDEMIA NOT REQUIRING STATIN THERAPY: Primary | ICD-10-CM

## 2024-05-09 DIAGNOSIS — R63.5 ABNORMAL WEIGHT GAIN: Primary | ICD-10-CM

## 2024-05-09 DIAGNOSIS — E66.3 OVERWEIGHT: ICD-10-CM

## 2024-05-09 PROCEDURE — RECHECK

## 2024-05-09 NOTE — PROGRESS NOTES
Patient last visit weight:197lb 3.2  Patient current visit weight: 188.6lb    If you are taking phentermine or other oral weight loss medications, are you experiencing any of the following symptoms:  Headache:   Blurred Vision:   Chest Pain:   Palpitations:  Insomnia:   SPECIFY ORAL MEDICATION AND DOSAGE:     If you are taking an injectable medication,  are you experiencing any of the following symptoms:  Bloating: NO  Nausea:NO  Vomiting: NO  Constipation: NO  Diarrhea:NO  SPECIFY INJECTABLE MEDICATION AND CURRENT DOSAGE: WEGOVY      Vitals:    Is BP less than 100/60?NO  Is BP greater than 140/90?NO  Is HR greater than 100?NO  **If yes to any of the above, have patient relax and repeat in 5-10 minutes**    Repeat values:    Is BP less than 100/60?  Is BP greater than 140/90?  Is HR greater than 100?  **If values remain outside of ranges above, please consult provider for next steps**

## 2024-05-09 NOTE — TELEPHONE ENCOUNTER
Patient came in for a nurse visit today. Patient is requesting a dose increase of wegovy to be sent to [facility] on file already. Patient stated that she doesn't need a PA because she pays out of pocket for the medication.

## 2024-05-24 DIAGNOSIS — E78.2 MODERATE MIXED HYPERLIPIDEMIA NOT REQUIRING STATIN THERAPY: ICD-10-CM

## 2024-05-24 DIAGNOSIS — E78.5 HYPERLIPIDEMIA: Primary | ICD-10-CM

## 2024-05-24 DIAGNOSIS — E66.3 OVERWEIGHT: ICD-10-CM

## 2024-06-06 ENCOUNTER — TELEPHONE (OUTPATIENT)
Age: 44
End: 2024-06-06

## 2024-06-06 DIAGNOSIS — E66.3 OVERWEIGHT: Primary | ICD-10-CM

## 2024-06-06 DIAGNOSIS — E78.5 HYPERLIPIDEMIA: ICD-10-CM

## 2024-06-06 NOTE — TELEPHONE ENCOUNTER
Patient was informed of the providers message. Patient stated that she pays out of pocket for her medication.

## 2024-06-06 NOTE — TELEPHONE ENCOUNTER
Most insurances dont do a 2 month supply . I could sent in a 3 month supply of the next dose 1.7mg.  Does she have a mail order company she uses

## 2024-06-06 NOTE — TELEPHONE ENCOUNTER
Patient is leaving to go [place] of [place] mid July until Sept, she was wondering if she could get a 60 day Wegovy rx for her next script to cover her while she is away?

## 2024-07-29 DIAGNOSIS — E78.5 HYPERLIPIDEMIA: Primary | ICD-10-CM

## 2024-07-29 DIAGNOSIS — E66.3 OVERWEIGHT: ICD-10-CM

## 2024-07-29 RX ORDER — SEMAGLUTIDE 1.7 MG/.75ML
INJECTION, SOLUTION SUBCUTANEOUS
Qty: 6 ML | Refills: 0 | Status: SHIPPED | OUTPATIENT
Start: 2024-07-29 | End: 2024-10-27

## 2024-08-14 DIAGNOSIS — E66.3 OVERWEIGHT: ICD-10-CM

## 2024-08-14 DIAGNOSIS — E78.2 MODERATE MIXED HYPERLIPIDEMIA NOT REQUIRING STATIN THERAPY: ICD-10-CM

## 2024-08-14 DIAGNOSIS — E66.3 OVERWEIGHT: Primary | ICD-10-CM

## 2024-08-14 RX ORDER — SEMAGLUTIDE 2.4 MG/.75ML
2.4 INJECTION, SOLUTION SUBCUTANEOUS WEEKLY
Refills: 2 | OUTPATIENT
Start: 2024-08-14

## 2024-08-29 DIAGNOSIS — E66.3 OVERWEIGHT: ICD-10-CM

## 2024-08-29 DIAGNOSIS — E78.2 MODERATE MIXED HYPERLIPIDEMIA NOT REQUIRING STATIN THERAPY: ICD-10-CM

## 2024-09-20 DIAGNOSIS — E78.2 MODERATE MIXED HYPERLIPIDEMIA NOT REQUIRING STATIN THERAPY: ICD-10-CM

## 2024-09-20 DIAGNOSIS — E66.3 OVERWEIGHT: ICD-10-CM

## 2024-10-11 ENCOUNTER — OFFICE VISIT (OUTPATIENT)
Dept: BARIATRICS | Facility: CLINIC | Age: 44
End: 2024-10-11
Payer: COMMERCIAL

## 2024-10-11 VITALS
RESPIRATION RATE: 17 BRPM | HEIGHT: 68 IN | BODY MASS INDEX: 25.13 KG/M2 | TEMPERATURE: 97.3 F | WEIGHT: 165.8 LBS | HEART RATE: 101 BPM | SYSTOLIC BLOOD PRESSURE: 98 MMHG | DIASTOLIC BLOOD PRESSURE: 69 MMHG

## 2024-10-11 DIAGNOSIS — E78.5 HYPERLIPIDEMIA: ICD-10-CM

## 2024-10-11 DIAGNOSIS — E78.2 MODERATE MIXED HYPERLIPIDEMIA NOT REQUIRING STATIN THERAPY: ICD-10-CM

## 2024-10-11 DIAGNOSIS — E66.3 OVERWEIGHT: Primary | ICD-10-CM

## 2024-10-11 PROCEDURE — 99214 OFFICE O/P EST MOD 30 MIN: CPT | Performed by: PHYSICIAN ASSISTANT

## 2024-10-11 NOTE — PROGRESS NOTES
Assessment/Plan:    Overweight  -Patient is  pursuing Conservative Program  -Calorie goals, sample menu ([id number]), portion size guidelines, and food logging reviewed with the patient.   -Screening labs and records reviewed from prior. Would recheck lipids and cmp  - STOP BANG-0/8    Goals:  -Food log (ie.) www.[employer]-1100  - To drink at least 64oz of water daily.No sugary beverages.  -recommend to increase activity    To continue on wegovy . 16% weight loss so far and tolerating well.  Will continue on this dose but do every 8 days.   Medication agreement signed    Initial Weight:197.2  Current Weight: 165.8  Change: -31.4  Goal Weight:happy with current weight        Hyperlipidemia  -should improve with weight loss, dietary, and lifestyle changes  -to recheck lipids        Return in about 6 years (around 10/11/2030) for 3 month nurse visit.       Diagnoses and all orders for this visit:    Overweight  -     Semaglutide-Weight Management (WEGOVY) 2.4 MG/0.75ML; Inject 0.75 mL (2.4 mg total) under the skin once a week    Hyperlipidemia  -     Comprehensive metabolic panel; Future  -     Lipid panel; Future  -     Semaglutide-Weight Management (WEGOVY) 2.4 MG/0.75ML; Inject 0.75 mL (2.4 mg total) under the skin once a week    Moderate mixed hyperlipidemia not requiring statin therapy  -     Semaglutide-Weight Management (WEGOVY) 2.4 MG/0.75ML; Inject 0.75 mL (2.4 mg total) under the skin once a week          Subjective:   Chief Complaint   Patient presents with    Follow-up     MWM-F/u; Waist-30in        Patient ID: Ashley Cantu  is a 44 y.o. female with excess weight/obesity here to pursue weight managment.  Patient is pursuing Conservative Program.     HPI    Wt Readings from Last 10 Encounters:   10/11/24 75.2 kg (165 lb 12.8 oz)   05/09/24 85.5 kg (188 lb 9.6 oz)   03/27/24 89.4 kg (197 lb 3.2 oz)   08/07/21 78.6 kg (173 lb 4.5 oz)   04/18/17 87.1 kg (192 lb 2.1 oz)   01/17/17  84.9 kg (187 lb 3 oz)   07/23/15 73.9 kg (163 lb)       Food logging:  Increased appetite/cravings:  Exercise:  Hydration:water, tea    Diet Recall: (usually eating breakfast or lunch and dinner)  B:eggs and some bread  L:sometimes . If so will do chicken and vegetables  D: meat, vegetable     The following portions of the patient's history were reviewed and updated as appropriate: She  has no past medical history on file.  She   Patient Active Problem List    Diagnosis Date Noted    Hyperlipidemia 03/27/2024    Overweight 03/27/2024    Migraine without aura 02/13/2015     She  has a past surgical history that includes Strabismus surgery and AUGMENTATION BREAST.  Her family history includes Thyroid disease in her mother.  She  reports that she has never smoked. She has never used smokeless tobacco. She reports that she does not drink alcohol. No history on file for drug use.  Current Outpatient Medications   Medication Sig Dispense Refill    rimegepant sulfate (Nurtec) 75 mg TBDP Take 75 mg by mouth as needed      Semaglutide-Weight Management (WEGOVY) 2.4 MG/0.75ML Inject 0.75 mL (2.4 mg total) under the skin once a week 3 mL 5     No current facility-administered medications for this visit.     Current Outpatient Medications on File Prior to Visit   Medication Sig    rimegepant sulfate (Nurtec) 75 mg TBDP Take 75 mg by mouth as needed    [DISCONTINUED] Semaglutide-Weight Management (WEGOVY) 2.4 MG/0.75ML Inject 0.75 mL (2.4 mg total) under the skin once a week     No current facility-administered medications on file prior to visit.     She has No Known Allergies..    Review of Systems   Constitutional:  Negative for fever.   Respiratory:  Negative for shortness of breath.    Cardiovascular:  Negative for chest pain and palpitations.   Gastrointestinal:  Negative for abdominal pain, constipation, diarrhea and vomiting.   Genitourinary:  Negative for difficulty urinating.   Skin:  Negative for rash.   Neurological:   "Negative for headaches.   Psychiatric/Behavioral:  Negative for dysphoric mood. The patient is not nervous/anxious.        Objective:    BP 98/69   Pulse 101   Temp (!) 97.3 °F (36.3 °C)   Resp 17   Ht 5' 8\" (1.727 m)   Wt 75.2 kg (165 lb 12.8 oz)   BMI 25.21 kg/m²      Physical Exam  Vitals and nursing note reviewed.   Constitutional:       General: She is not in acute distress.     Appearance: Normal appearance. She is well-developed.   HENT:      Head: Normocephalic and atraumatic.   Eyes:      Conjunctiva/sclera: Conjunctivae normal.   Neck:      Thyroid: No thyromegaly.   Pulmonary:      Effort: Pulmonary effort is normal. No respiratory distress.   Skin:     Findings: No rash (visible).   Neurological:      Mental Status: She is alert and oriented to person, place, and time.   Psychiatric:         Mood and Affect: Mood normal.         Behavior: Behavior normal.        "

## 2024-10-11 NOTE — ASSESSMENT & PLAN NOTE
-Patient is  pursuing Conservative Program  -Calorie goals, sample menu (3842-3695), portion size guidelines, and food logging reviewed with the patient.   -Screening labs and records reviewed from prior. Would recheck lipids and cmp  - STOP BANG-0/8    Goals:  -Food log (ie.) www.[employer]-1100  - To drink at least 64oz of water daily.No sugary beverages.  -recommend to increase activity    To continue on wegovy . 16% weight loss so far and tolerating well.  Will continue on this dose but do every 8 days.   Medication agreement signed    Initial Weight:197.2  Current Weight: 165.8  Change: -31.4  Goal Weight:happy with current weight

## 2024-10-24 ENCOUNTER — HOSPITAL ENCOUNTER (EMERGENCY)
Facility: HOSPITAL | Age: 44
Discharge: HOME | End: 2024-10-24
Attending: EMERGENCY MEDICINE
Payer: COMMERCIAL

## 2024-10-24 VITALS
TEMPERATURE: 97.7 F | DIASTOLIC BLOOD PRESSURE: 68 MMHG | BODY MASS INDEX: 25.38 KG/M2 | WEIGHT: 166.89 LBS | SYSTOLIC BLOOD PRESSURE: 137 MMHG | HEART RATE: 100 BPM | OXYGEN SATURATION: 100 % | RESPIRATION RATE: 18 BRPM

## 2024-10-24 DIAGNOSIS — M25.552 PAIN OF LEFT HIP: ICD-10-CM

## 2024-10-24 DIAGNOSIS — N39.0 UTI (URINARY TRACT INFECTION): ICD-10-CM

## 2024-10-24 DIAGNOSIS — M54.16 LUMBAR RADICULOPATHY: Primary | ICD-10-CM

## 2024-10-24 LAB
BACTERIA UR QL AUTO: ABNORMAL /HPF
BILIRUB UR QL STRIP: NEGATIVE
CLARITY UR: CLEAR
COLOR UR: YELLOW
EXT PREGNANCY TEST URINE: NEGATIVE
EXT. CONTROL: NORMAL
GLUCOSE UR STRIP-MCNC: NEGATIVE MG/DL
HGB UR QL STRIP.AUTO: ABNORMAL
KETONES UR STRIP-MCNC: NEGATIVE MG/DL
LEUKOCYTE ESTERASE UR QL STRIP: ABNORMAL
NITRITE UR QL STRIP: POSITIVE
NON-SQ EPI CELLS URNS QL MICRO: ABNORMAL /HPF
PH UR STRIP.AUTO: 7.5 [PH] (ref 4.5–8)
PROT UR STRIP-MCNC: NEGATIVE MG/DL
RBC #/AREA URNS AUTO: ABNORMAL /HPF
SP GR UR STRIP.AUTO: 1.02 (ref 1–1.03)
UROBILINOGEN UR QL STRIP.AUTO: 0.2 E.U./DL
WBC #/AREA URNS AUTO: ABNORMAL /HPF

## 2024-10-24 PROCEDURE — 99284 EMERGENCY DEPT VISIT MOD MDM: CPT

## 2024-10-24 PROCEDURE — 81025 URINE PREGNANCY TEST: CPT

## 2024-10-24 PROCEDURE — 81001 URINALYSIS AUTO W/SCOPE: CPT

## 2024-10-24 PROCEDURE — 99283 EMERGENCY DEPT VISIT LOW MDM: CPT

## 2024-10-24 PROCEDURE — 96372 THER/PROPH/DIAG INJ SC/IM: CPT

## 2024-10-24 RX ORDER — CEPHALEXIN 500 MG/1
500 CAPSULE ORAL EVERY 12 HOURS SCHEDULED
Qty: 14 CAPSULE | Refills: 0 | Status: SHIPPED | OUTPATIENT
Start: 2024-10-24 | End: 2024-10-31

## 2024-10-24 RX ORDER — DIAZEPAM 5 MG/1
5 TABLET ORAL ONCE
Status: COMPLETED | OUTPATIENT
Start: 2024-10-24 | End: 2024-10-24

## 2024-10-24 RX ORDER — KETOROLAC TROMETHAMINE 30 MG/ML
15 INJECTION, SOLUTION INTRAMUSCULAR; INTRAVENOUS ONCE
Status: COMPLETED | OUTPATIENT
Start: 2024-10-24 | End: 2024-10-24

## 2024-10-24 RX ORDER — NAPROXEN 500 MG/1
500 TABLET ORAL 2 TIMES DAILY WITH MEALS
Qty: 30 TABLET | Refills: 0 | Status: SHIPPED | OUTPATIENT
Start: 2024-10-24

## 2024-10-24 RX ORDER — METHOCARBAMOL 500 MG/1
500 TABLET, FILM COATED ORAL 2 TIMES DAILY
Qty: 20 TABLET | Refills: 0 | Status: SHIPPED | OUTPATIENT
Start: 2024-10-24

## 2024-10-24 RX ORDER — LIDOCAINE 50 MG/G
1 PATCH TOPICAL ONCE
Status: DISCONTINUED | OUTPATIENT
Start: 2024-10-24 | End: 2024-10-25 | Stop reason: HOSPADM

## 2024-10-24 RX ADMIN — DIAZEPAM 5 MG: 5 TABLET ORAL at 22:59

## 2024-10-24 RX ADMIN — KETOROLAC TROMETHAMINE 15 MG: 30 INJECTION, SOLUTION INTRAMUSCULAR; INTRAVENOUS at 22:59

## 2024-10-24 RX ADMIN — CEPHALEXIN 500 MG: 250 CAPSULE ORAL at 23:38

## 2024-10-24 RX ADMIN — LIDOCAINE 1 PATCH: 50 PATCH TOPICAL at 22:57

## 2024-10-24 NOTE — Clinical Note
Ashley Cantu was seen and treated in our emergency department on 10/24/2024.                Diagnosis: Medical condition    Ashley  may return to work on return date.    She may return on this date: 10/26/2024         If you have any questions or concerns, please don't hesitate to call.      Surekha Gonzalez PA-C    ______________________________           _______________          _______________  Hospital Representative                              Date                                Time

## 2024-10-25 ENCOUNTER — NURSE TRIAGE (OUTPATIENT)
Dept: PHYSICAL THERAPY | Facility: OTHER | Age: 44
End: 2024-10-25

## 2024-10-25 ENCOUNTER — TELEPHONE (OUTPATIENT)
Dept: PHYSICAL THERAPY | Facility: OTHER | Age: 44
End: 2024-10-25

## 2024-10-25 DIAGNOSIS — M54.50 ACUTE LEFT-SIDED LOW BACK PAIN, UNSPECIFIED WHETHER SCIATICA PRESENT: Primary | ICD-10-CM

## 2024-10-25 DIAGNOSIS — M25.552 LEFT HIP PAIN: ICD-10-CM

## 2024-10-25 NOTE — ED PROVIDER NOTES
"Time reflects when diagnosis was documented in both MDM as applicable and the Disposition within this note       Time User Action Codes Description Comment    10/24/2024 11:36 PM Surekha Gonzaelz [M54.16] Lumbar radiculopathy     10/24/2024 11:36 PM Surekha Gonzalez [M25.552] Pain of left hip     10/24/2024 11:36 PM Surekha Gonzalez [N39.0] UTI (urinary tract infection)           ED Disposition       ED Disposition   Discharge    Condition   Stable    Date/Time   Thu Oct 24, 2024 11:41 PM    Comment   Ashley Cantu discharge to home/self care.                   Assessment & Plan       Medical Decision Making  Ddx includes vertebral fracture, vertebral misalignment, muscle strain, muscle spasm, herniated disc, radiculopathy, pyelonephritis, nephrolithiasis    Patient has hx of pyelonephritis, location consistent with MSK/ radiculopathy rather than pyelo but UA ordered due to history. UA suggestive of UTI. Patient Afebrile, feels improved following valium and Toradol. Will place of keflex. Discussed supportive care and follow up with PCP and spine program.     Discussed findings from the visit with the patient.  We had a conversation regarding supportive care and indications for return.  Recommended appropriate follow-up.  Patient and/or family understand and agree with plan.    Portions of the record may have been created with voice recognition software. Occasional use of the incorrect word or \"sound a like\" substitutions may have occurred due to the inherent limitations of voice recognition software. Read the chart carefully and recognize, using context, where substitutions have occurred.         Amount and/or Complexity of Data Reviewed  Labs: ordered.    Risk  Prescription drug management.             Medications   lidocaine (LIDODERM) 5 % patch 1 patch (1 patch Topical Medication Applied 10/24/24 2257)   diazepam (VALIUM) tablet 5 mg (5 mg Oral Given 10/24/24 2259)   ketorolac (TORADOL) injection 15 mg " (15 mg Intramuscular Given 10/24/24 4541)   cephalexin (KEFLEX) capsule 500 mg (500 mg Oral Given 10/24/24 7718)       ED Risk Strat Scores                           SBIRT 22yo+      Flowsheet Row Most Recent Value   Initial Alcohol Screen: US AUDIT-C     1. How often do you have a drink containing alcohol? 0 Filed at: 10/24/2024 2122   2. How many drinks containing alcohol do you have on a typical day you are drinking?  0 Filed at: 10/24/2024 2122   3a. Male UNDER 65: How often do you have five or more drinks on one occasion? 0 Filed at: 10/24/2024 2122   3b. FEMALE Any Age, or MALE 65+: How often do you have 4 or more drinks on one occassion? 0 Filed at: 10/24/2024 2122   Audit-C Score 0 Filed at: 10/24/2024 2122   KEESHA: How many times in the past year have you...    Used an illegal drug or used a prescription medication for non-medical reasons? Never Filed at: 10/24/2024 2122                            History of Present Illness       Chief Complaint   Patient presents with    Hip Pain     Pt complaining of left hip pain, left lower back pain, and left leg pain x10 days. Pt states that today pain increased. Took motrin and tylenol approx 1 hr pta without relief.        History reviewed. No pertinent past medical history.   Past Surgical History:   Procedure Laterality Date    AUGMENTATION BREAST      STRABISMUS SURGERY        Family History   Problem Relation Age of Onset    Thyroid disease Mother       Social History     Tobacco Use    Smoking status: Never    Smokeless tobacco: Never   Vaping Use    Vaping status: Never Used   Substance Use Topics    Alcohol use: Never    Drug use: Never      E-Cigarette/Vaping    E-Cigarette Use Never User       E-Cigarette/Vaping Substances    Nicotine No     THC No     CBD No     Flavoring No     Other No     Unknown No       I have reviewed and agree with the history as documented.     Patient presents with:  Hip Pain: Pt complaining of left hip pain, left lower back pain,  "and left leg pain x10 days. Pt states that today pain increased. Took motrin and tylenol approx 1 hr pta without relief.     44-year-old female presenting to the emergency department for left lower back pain that wraps around the hip and goes to the thigh x 10 days.  She reports that the pain worsened today.  She denies trauma or injury.  States that she has a very active job where she lifts frequently.  She took Tylenol Motrin earlier without improvement.  Denies fever, chills, dysuria.  She currently has her menstrual cycle but is unaware of hematuria previously.  She has history of pyelonephritis but states that this feels different this feels more \"muscular\".  She reports that she \"does not have pain over the bone.\"    History reviewed. No pertinent past medical history.        Hip Pain  Associated symptoms: no abdominal pain, no chest pain, no congestion, no cough, no diarrhea, no fatigue, no fever, no myalgias, no nausea, no rash, no shortness of breath and no vomiting        Review of Systems   Constitutional:  Negative for fatigue and fever.   HENT:  Negative for congestion.    Respiratory:  Negative for cough and shortness of breath.    Cardiovascular:  Negative for chest pain.   Gastrointestinal:  Negative for abdominal pain, diarrhea, nausea and vomiting.   Genitourinary:  Negative for dysuria, flank pain, pelvic pain and urgency.   Musculoskeletal:  Positive for back pain. Negative for joint swelling and myalgias.   Skin:  Negative for rash.   Neurological:  Negative for weakness and numbness.           Objective       ED Triage Vitals [10/24/24 2119]   Temperature Pulse Blood Pressure Respirations SpO2 Patient Position - Orthostatic VS   97.7 °F (36.5 °C) 100 137/68 18 100 % Sitting      Temp Source Heart Rate Source BP Location FiO2 (%) Pain Score    Oral Monitor Right arm -- 10 - Worst Possible Pain      Vitals      Date and Time Temp Pulse SpO2 Resp BP Pain Score FACES Pain Rating User   10/24/24 " 2259 -- -- -- -- -- 10 - Worst Possible Pain -- CC   10/24/24 2119 97.7 °F (36.5 °C) 100 100 % 18 137/68 10 - Worst Possible Pain -- RM            Physical Exam  Constitutional:       General: She is not in acute distress.     Appearance: Normal appearance. She is not ill-appearing or diaphoretic.   Pulmonary:      Effort: Pulmonary effort is normal. No respiratory distress.      Breath sounds: Normal breath sounds. No wheezing, rhonchi or rales.   Abdominal:      General: There is no distension.      Tenderness: There is no abdominal tenderness.   Musculoskeletal:      Cervical back: Normal range of motion. No tenderness.      Thoracic back: No bony tenderness. Normal range of motion.      Lumbar back: No bony tenderness. Normal range of motion. Negative right straight leg raise test and negative left straight leg raise test.        Back:       Right lower leg: No edema.      Left lower leg: No edema.        Legs:       Comments: Pain from low back wrapping around the hip to the knee in L3-4 distribution. No bony tenderness. Strength, sensation intact.   Skin:     Findings: No bruising or rash.   Neurological:      General: No focal deficit present.      Mental Status: She is alert and oriented to person, place, and time.      Cranial Nerves: No cranial nerve deficit.      Motor: No weakness.      Gait: Gait normal.         Results Reviewed       Procedure Component Value Units Date/Time    Urine Microscopic [[id number]]  (Abnormal) Collected: 10/24/24 2259    Lab Status: Final result Specimen: Urine, Clean Catch Updated: 10/24/24 2313     RBC, UA 1-2 /hpf      WBC, UA 1-2 /hpf      Epithelial Cells Occasional /hpf      Bacteria, UA Moderate /hpf     Urine Macroscopic, POC [[id number]]  (Abnormal) Collected: 10/24/24 2259    Lab Status: Final result Specimen: Urine Updated: 10/24/24 2300     Color, UA Yellow     Clarity, UA Clear     pH, UA 7.5     Leukocytes, UA Trace     Nitrite, UA Positive     Protein, UA  Negative mg/dl      Glucose, UA Negative mg/dl      Ketones, UA Negative mg/dl      Urobilinogen, UA 0.2 E.U./dl      Bilirubin, UA Negative     Occult Blood, UA Moderate     Specific Gravity, UA 1.020    Narrative:      CLINITEK RESULT    POCT pregnancy, urine [[id number]]  (Normal) Resulted: 10/24/24 2259    Lab Status: Final result Updated: 10/24/24 2259     EXT Preg Test, Ur Negative     Control Valid            No orders to display       Procedures    ED Medication and Procedure Management   Prior to Admission Medications   Prescriptions Last Dose Informant Patient Reported? Taking?   Semaglutide-Weight Management (WEGOVY) 2.4 MG/0.75ML   No No   Sig: Inject 0.75 mL (2.4 mg total) under the skin once a week   rimegepant sulfate (Nurtec) 75 mg TBDP   Yes No   Sig: Take 75 mg by mouth as needed      Facility-Administered Medications: None     Discharge Medication List as of 10/24/2024 11:41 PM        START taking these medications    Details   cephalexin (KEFLEX) 500 mg capsule Take 1 capsule (500 mg total) by mouth every 12 (twelve) hours for 7 days, Starting Thu 10/24/2024, Until Thu 10/31/2024, Normal      Diclofenac Sodium (VOLTAREN) 1 % Apply 2 g topically 4 (four) times a day, Starting Thu 10/24/2024, Normal      methocarbamol (ROBAXIN) 500 mg tablet Take 1 tablet (500 mg total) by mouth 2 (two) times a day, Starting Thu 10/24/2024, Normal      naproxen (Naprosyn) 500 mg tablet Take 1 tablet (500 mg total) by mouth 2 (two) times a day with meals, Starting Thu 10/24/2024, Normal           CONTINUE these medications which have NOT CHANGED    Details   rimegepant sulfate (Nurtec) 75 mg TBDP Take 75 mg by mouth as needed, Starting Thu 1/11/2024, Historical Med      Semaglutide-Weight Management (WEGOVY) 2.4 MG/0.75ML Inject 0.75 mL (2.4 mg total) under the skin once a week, Starting Fri 10/11/2024, Until Fri 3/28/2025, Normal             ED SEPSIS DOCUMENTATION   Time reflects when diagnosis was documented in  both MDM as applicable and the Disposition within this note       Time User Action Codes Description Comment    10/24/2024 11:36 PM Surekha Gonzalez [M54.16] Lumbar radiculopathy     10/24/2024 11:36 PM Surekha Gonzalez [M25.552] Pain of left hip     10/24/2024 11:36 PM Surekha Gonzalez [N39.0] UTI (urinary tract infection)                  Surekha Gonzalez PA-C  10/25/24 0141

## 2024-10-25 NOTE — TELEPHONE ENCOUNTER
"Additional Information   Negative: Is this related to a work injury?   Negative: Is this related to an MVA?   Negative: Are you currently recieving homecare services?    Background - Initial Assessment  Clinical complaint: Pain is left low back, left hip and into left groin down front of left leg to the knee. No numbness or tingling. Started 10/14/24. NKI. No prior back pain, no prior back SX. Pain started out intermittent and became constant as of 10/24/24. Pt was seen in ED 10/24/24. Pain feels like a \"prolonged stretch\"  Date of onset: 10/14/24  Frequency of pain: constant  Quality of pain: \"constant pain\", \"prolonged stretch\"    Protocols used: [facility] Protocol    "

## 2024-10-25 NOTE — DISCHARGE INSTRUCTIONS
Complete course of Keflex.  Use naproxen, Tylenol, Robaxin for pain.  Apply Voltaren gel topically.  Use caution driving or operating machinery while taking Robaxin.  Follow-up with the comprehensive spine program.

## 2024-10-25 NOTE — TELEPHONE ENCOUNTER
Additional Information   Negative: Has the patient had unexplained weight loss?   Negative: Does the patient have a fever?   Negative: Is the patient experiencing urine retention?   Negative: Is the patient experiencing acute drop foot or paralysis?   Negative: Has the patient experienced major trauma? (fall from height, high speed collision, direct blow to spine) and is also experiencing nausea, light-headedness, or loss of consciousness?   Negative: Is the patient experiencing blood in sputum?   Negative: Is this a chronic condition?    Protocols used: [facility] Protocol  Nurse completed triage and NO RF s/s present. Referral entered for the [place] site and contact/phone number info given to the patient as well.   Patients information was sent to the preferred site and pt made aware clerical would be calling to schedule the evaluation appointment. Nurse encouraged him to call the site if he does not hear from clerical beforehand. Patient Agreed.    Nurse also offered a call from the  counselor d/t SBT score. Patient declined. Patient was pleasant and appropriate during this encounter.     Patient did not voice any additional questions or concerns at this time.   Patient is aware current/past complaints, relevant dx, additional referrals and treatment/options will be discussed at the evaluation/consultation appointment.   Patient is in agreement with plan to be evaluated by [facility] therapist/DPT.   Patient very appreciative of CB and referral placement.     Nurse wished him well and CS referral closed.

## 2024-11-01 ENCOUNTER — EVALUATION (OUTPATIENT)
Dept: PHYSICAL THERAPY | Facility: MEDICAL CENTER | Age: 44
End: 2024-11-01
Payer: COMMERCIAL

## 2024-11-01 DIAGNOSIS — M25.552 LEFT HIP PAIN: ICD-10-CM

## 2024-11-01 DIAGNOSIS — M54.50 ACUTE LEFT-SIDED LOW BACK PAIN, UNSPECIFIED WHETHER SCIATICA PRESENT: Primary | ICD-10-CM

## 2024-11-01 PROCEDURE — 97161 PT EVAL LOW COMPLEX 20 MIN: CPT | Performed by: PHYSICAL THERAPIST

## 2024-11-01 NOTE — LETTER
2024    Reyna Rascon MD  [address] [facility] [address] [facility]  [facility] [address]    Patient: Ashley Cantu   YOB: 1980   Date of Visit: 2024     Encounter Diagnosis     ICD-10-CM    1. Acute left-sided low back pain, unspecified whether sciatica present  M54.50 Ambulatory referral to PT spine      2. Left hip pain  M25.552 Ambulatory referral to PT spine          Dear Dr. Rascon:    Thank you for your recent referral of Ashley Cantu. Please review the attached evaluation summary from Ashley's recent visit.     Please verify that you agree with the plan of care by signing the attached order.     If you have any questions or concerns, please do not hesitate to call.     I sincerely appreciate the opportunity to share in the care of one of your patients and hope to have another opportunity to work with you in the near future.       Sincerely,    Delon Artis, PT      Referring Provider:      I certify that I have read the below Plan of Care and certify the need for these services furnished under this plan of treatment while under my care.                    Reyna Rascon MD  [address] [facility]  [facility] [address]  Via Fax: [phone]          PT Evaluation     Today's date: 2024  Patient name: Ashley Cantu  : 1980  MRN: [id number]  Referring provider: Maldonado Mac PT  Dx:   Encounter Diagnosis     ICD-10-CM    1. Acute left-sided low back pain, unspecified whether sciatica present  M54.50 Ambulatory referral to PT spine      2. Left hip pain  M25.552 Ambulatory referral to PT spine                     Assessment  Impairments: abnormal muscle firing, abnormal muscle tone, abnormal or restricted ROM, impaired physical strength, lacks appropriate home exercise program and pain with function    Assessment details: Ashley Cantu is a 44 y.o. female was evaluated on 2024  for Acute left-sided low back pain, unspecified whether sciatica present  (primary encounter  diagnosis)  Left hip pain. Ashley Cantu has the above listed impairments resulting in functional deficits and negative impact to quality of life.  Patient is appropriate for skilled PT intervention to promote maximal return to function and patient specific goals.      Patient agrees with outlined treatment plan and all questions were answered to their satisfaction.      Understanding of Dx/Px/POC: good     Prognosis: good    Goals  Patient will successfully transition to home exercise program.  Patient will be able to manage symptoms independently.    Ashley will report resolution of L groin pain   Zgail will resume normal daily activity and work activity without pain complaints     Plan  Patient would benefit from: skilled PT  Referral necessary: No  Planned modality interventions: thermotherapy: hydrocollator packs    Planned therapy interventions: home exercise program, manual therapy, neuromuscular re-education, patient education, functional ROM exercises, strengthening, stretching, joint mobilization, graded activity, graded exercise, therapeutic exercise, body mechanics training, motor coordination training and activity modification    Frequency: 1x week  Duration in weeks: 12  Treatment plan discussed with: patient        Subjective Evaluation    History of Present Illness  Mechanism of injury: Ashley Cantu is a 44 y.o. female presenting to therapy with complaints of low back pain and left groin pain.  She notes that her back began to bother her 2 weeks ago, attempted to use her inversion table as she frequently does and the pain increased greatly and radiated to L groin and anterior thigh.   She has been on muscle relaxer and naproxen for a week with some benefit and no more radiation to thigh.  She still has constant groin pain and mild low back pain.     Patient Goals  Patient goals for therapy: decreased pain, increased motion, return to sport/leisure activities, independence with ADLs/IADLs and increased  strength    Pain  Current pain ratin  At best pain ratin  At worst pain ratin  Quality: dull ache, discomfort, pressure, tight and sharp          Objective  Red Flag Screening:  History Cancer (-)  Bowl/Bladder dysfunction (-)  Night pain (-)  Unexplained weight loss (-)     Dermatomes:  WNL   Myotomes:  WNL    Reflexes:   Patellar R 2+, L 2+  Achilles R 2+, L 2+      Lumbar:  AROM:   Flexion WFL  Extension WFL  Side bending WFL  Rotation WFL    Repeated Motion Testing: Unremarkable    Active Motion Observations: Unremarkable     Palpation:  Notable pain and tenderness to L iliopsoas with comparable groin pain    PAIVM: Comparable pain in hips with UPA L3-5     Special Tests:   Crossed SLR (-), SLR (-) Prone instability (-)   Neural Dynamic Testing: Tibial Bias (-), Peroneal Bias (-)   Slump Testing (-) with and without axial compression       Hip   WFL unable to reproduce any groin pain with hip provocative testing   Special Tests: BABAK (-), FADIR (-)                         Precautions: None      Manuals             MFR to iliopsoas .  AF                                                    Neuro Re-Ed                                                                                                        Ther Ex             Hip flexor stretch             Heel slides                                                                                            Ther Activity                                       Gait Training                                       Modalities

## 2024-11-01 NOTE — PROGRESS NOTES
PT Evaluation     Today's date: 2024  Patient name: Ashley Cantu  : 1980  MRN: [id number]  Referring provider: Maldonado Mac PT  Dx:   Encounter Diagnosis     ICD-10-CM    1. Acute left-sided low back pain, unspecified whether sciatica present  M54.50 Ambulatory referral to PT spine      2. Left hip pain  M25.552 Ambulatory referral to PT spine                     Assessment  Impairments: abnormal muscle firing, abnormal muscle tone, abnormal or restricted ROM, impaired physical strength, lacks appropriate home exercise program and pain with function    Assessment details: Ashley Cantu is a 44 y.o. female was evaluated on 2024  for Acute left-sided low back pain, unspecified whether sciatica present  (primary encounter diagnosis)  Left hip pain. Ashley Cantu has the above listed impairments resulting in functional deficits and negative impact to quality of life.  Patient is appropriate for skilled PT intervention to promote maximal return to function and patient specific goals.      Patient agrees with outlined treatment plan and all questions were answered to their satisfaction.      Understanding of Dx/Px/POC: good     Prognosis: good    Goals  Patient will successfully transition to home exercise program.  Patient will be able to manage symptoms independently.    Zual will report resolution of L groin pain   Zual will resume normal daily activity and work activity without pain complaints     Plan  Patient would benefit from: skilled PT  Referral necessary: No  Planned modality interventions: thermotherapy: hydrocollator packs    Planned therapy interventions: home exercise program, manual therapy, neuromuscular re-education, patient education, functional ROM exercises, strengthening, stretching, joint mobilization, graded activity, graded exercise, therapeutic exercise, body mechanics training, motor coordination training and activity modification    Frequency: 1x week  Duration in weeks:  12  Treatment plan discussed with: patient        Subjective Evaluation    History of Present Illness  Mechanism of injury: Ashley Cantu is a 44 y.o. female presenting to therapy with complaints of low back pain and left groin pain.  She notes that her back began to bother her 2 weeks ago, attempted to use her inversion table as she frequently does and the pain increased greatly and radiated to L groin and anterior thigh.   She has been on muscle relaxer and naproxen for a week with some benefit and no more radiation to thigh.  She still has constant groin pain and mild low back pain.     Patient Goals  Patient goals for therapy: decreased pain, increased motion, return to sport/leisure activities, independence with ADLs/IADLs and increased strength    Pain  Current pain ratin  At best pain ratin  At worst pain ratin  Quality: dull ache, discomfort, pressure, tight and sharp          Objective  Red Flag Screening:  History Cancer (-)  Bowl/Bladder dysfunction (-)  Night pain (-)  Unexplained weight loss (-)     Dermatomes:  WNL   Myotomes:  WNL    Reflexes:   Patellar R 2+, L 2+  Achilles R 2+, L 2+      Lumbar:  AROM:   Flexion WFL  Extension WFL  Side bending WFL  Rotation WFL    Repeated Motion Testing: Unremarkable    Active Motion Observations: Unremarkable     Palpation:  Notable pain and tenderness to L iliopsoas with comparable groin pain    PAIVM: Comparable pain in hips with UPA L3-5     Special Tests:   Crossed SLR (-), SLR (-) Prone instability (-)   Neural Dynamic Testing: Tibial Bias (-), Peroneal Bias (-)   Slump Testing (-) with and without axial compression       Hip   WFL unable to reproduce any groin pain with hip provocative testing   Special Tests: BABAK (-), FADIR (-)                         Precautions: None      Manuals             MFR to iliopsoas .  AF                                                    Neuro Re-Ed                                                                                                         Ther Ex             Hip flexor stretch             Heel slides                                                                                            Ther Activity                                       Gait Training                                       Modalities

## 2024-12-07 LAB
ALBUMIN SERPL-MCNC: 4 G/DL (ref 3.5–5.7)
ALP SERPL-CCNC: 29 U/L (ref 35–120)
ALT SERPL-CCNC: 16 U/L
ANION GAP SERPL CALCULATED.3IONS-SCNC: 7 MMOL/L (ref 3–11)
AST SERPL-CCNC: 16 U/L
BILIRUB SERPL-MCNC: 0.4 MG/DL (ref 0.2–1)
BUN SERPL-MCNC: 11 MG/DL (ref 7–25)
CALCIUM SERPL-MCNC: 8.7 MG/DL (ref 8.5–10.5)
CHLORIDE SERPL-SCNC: 104 MMOL/L (ref 100–109)
CHOLEST SERPL-MCNC: 163 MG/DL
CHOLEST/HDLC SERPL: 3.3 {RATIO}
CO2 SERPL-SCNC: 28 MMOL/L (ref 21–31)
CREAT SERPL-MCNC: 0.72 MG/DL (ref 0.4–1.1)
CYTOLOGY CMNT CVX/VAG CYTO-IMP: ABNORMAL
GFR/BSA.PRED SERPLBLD CYS-BASED-ARV: 104 ML/MIN/{1.73_M2}
GLUCOSE SERPL-MCNC: 80 MG/DL (ref 65–99)
HDLC SERPL-MCNC: 50 MG/DL (ref 23–92)
LDLC SERPL CALC-MCNC: 87 MG/DL
NONHDLC SERPL-MCNC: 113 MG/DL
POTASSIUM SERPL-SCNC: 4 MMOL/L (ref 3.5–5.2)
PROT SERPL-MCNC: 6.1 G/DL (ref 6.3–8.3)
SODIUM SERPL-SCNC: 139 MMOL/L (ref 135–145)
TRIGL SERPL-MCNC: 128 MG/DL

## 2024-12-09 ENCOUNTER — RESULTS FOLLOW-UP (OUTPATIENT)
Dept: BARIATRICS | Facility: CLINIC | Age: 44
End: 2024-12-09

## 2024-12-09 DIAGNOSIS — E78.5 HYPERLIPIDEMIA, UNSPECIFIED HYPERLIPIDEMIA TYPE: ICD-10-CM

## 2024-12-09 DIAGNOSIS — E66.3 OVERWEIGHT: Primary | ICD-10-CM

## 2024-12-09 DIAGNOSIS — E78.2 MODERATE MIXED HYPERLIPIDEMIA NOT REQUIRING STATIN THERAPY: ICD-10-CM

## 2025-01-17 ENCOUNTER — CLINICAL SUPPORT (OUTPATIENT)
Dept: BARIATRICS | Facility: CLINIC | Age: 45
End: 2025-01-17

## 2025-01-17 VITALS — RESPIRATION RATE: 16 BRPM | BODY MASS INDEX: 23.89 KG/M2 | HEIGHT: 68 IN | WEIGHT: 157.6 LBS | TEMPERATURE: 96.9 F

## 2025-01-17 DIAGNOSIS — R63.5 ABNORMAL WEIGHT GAIN: Primary | ICD-10-CM

## 2025-01-17 DIAGNOSIS — E66.3 OVERWEIGHT: ICD-10-CM

## 2025-01-17 DIAGNOSIS — E78.2 MODERATE MIXED HYPERLIPIDEMIA NOT REQUIRING STATIN THERAPY: ICD-10-CM

## 2025-01-17 PROCEDURE — RECHECK

## 2025-01-17 NOTE — PROGRESS NOTES
Patient last visit weight:165lbs  Patient current visit weight:157.6lbs    If you are taking phentermine or other oral weight loss medications, are you experiencing any of the following symptoms:  Headache:   Blurred Vision:   Chest Pain:   Palpitations:  Insomnia:   SPECIFY ORAL MEDICATION AND DOSAGE:     If you are taking an injectable medication,  are you experiencing any of the following symptoms:  Bloating:  no  Nausea: no  Vomiting:  no  Constipation:  no  Diarrhea: no  SPECIFY INJECTABLE MEDICATION AND CURRENT DOSAGE:Wegovy 1.7mg   Pt askimg for refill for 1.7mg she good with her weight ob the 1.7mg  Vitals:    Is BP less than 100/60? no  Is BP greater than 140/90? no  Is HR greater than 100? no  **If yes to any of the above, have patient relax and repeat in 5-10 minutes**    Repeat values:    Is BP less than 100/60?  Is BP greater than 140/90?  Is HR greater than 100?  **If values remain outside of ranges above, please consult provider for next steps**      Date of last injection: 1/17/25    How many injections do you have left: 1

## 2025-02-06 DIAGNOSIS — E66.3 OVERWEIGHT: ICD-10-CM

## 2025-02-06 DIAGNOSIS — E78.2 MODERATE MIXED HYPERLIPIDEMIA NOT REQUIRING STATIN THERAPY: ICD-10-CM

## 2025-02-06 NOTE — TELEPHONE ENCOUNTER
How are you tolerating the medication?   [] Nausea  [] Vomiting  [] Diarrhea  [x] Asymptomatic  [] Other:    Last visit weight: she doesn't remember     Current weight: 163 lbs    Date of last injection: 1/30    How many injections do you have left: 0 needs refill for tomorrow's shot    Pt would like refiill of wegovy to be sent to [facility] instead of [facility]

## 2025-03-17 DIAGNOSIS — E78.2 MODERATE MIXED HYPERLIPIDEMIA NOT REQUIRING STATIN THERAPY: ICD-10-CM

## 2025-03-17 DIAGNOSIS — E66.3 OVERWEIGHT: ICD-10-CM

## 2025-05-09 ENCOUNTER — OFFICE VISIT (OUTPATIENT)
Dept: BARIATRICS | Facility: CLINIC | Age: 45
End: 2025-05-09
Payer: COMMERCIAL

## 2025-05-09 VITALS
HEART RATE: 99 BPM | DIASTOLIC BLOOD PRESSURE: 74 MMHG | HEIGHT: 68 IN | SYSTOLIC BLOOD PRESSURE: 118 MMHG | BODY MASS INDEX: 24.49 KG/M2 | WEIGHT: 161.6 LBS | TEMPERATURE: 97.6 F | RESPIRATION RATE: 16 BRPM

## 2025-05-09 DIAGNOSIS — E61.1 IRON DEFICIENCY: ICD-10-CM

## 2025-05-09 DIAGNOSIS — E78.2 MODERATE MIXED HYPERLIPIDEMIA NOT REQUIRING STATIN THERAPY: ICD-10-CM

## 2025-05-09 DIAGNOSIS — E66.3 OVERWEIGHT: Primary | ICD-10-CM

## 2025-05-09 PROCEDURE — 99214 OFFICE O/P EST MOD 30 MIN: CPT | Performed by: PHYSICIAN ASSISTANT

## 2025-05-09 NOTE — PROGRESS NOTES
Assessment & Plan  Overweight  Continue Wegovy 1.7mg weekly to maintain current Weight  Currently paying Cash for medication- discussed option of [facility] if cost increases  Discussed option(Not FDA approved) to increasing interval of dosingevery 10-14 days of tolerating and does not cause increase in appetite, side effects, or weight gain  Continue Current Nutrition plan with protein at each meal  Continue regular exercise-- consider joining [employer]  Moderate mixed hyperlipidemia not requiring statin therapy  This has improved.   Iron deficiency  Advised her that this may contribute to hair loss  Continue supplements.        Return in about 6 months (around 11/9/2025).         Subjective:   Chief Complaint   Patient presents with   • Follow-up     MWM F/u; Waist 29in.       Patient ID: Ashley Cantu  is a 45 y.o. female with excess weight/obesity here for Weight Management Follow up Visit    HPI: Here for Medical Weight Management Follow Up Visit    Obesity/Excess Weight:  Current BMI: Body mass index is 24.57 kg/m².   Initial Weight: 197.2  Last visit Weight: 165.8 on 10/11/2024  Current Weight: 161 BMI 24.57  Goal to maintain current weight     Missed 1 dose while travelling in [place] -- took after 11 day, no side effects.     Current Weight Management Plan:   Current Medication: Wegovy 1.7mg weekly   Paying Cash at Pharmacy   Medication Side effects:  None, but she has noticed hair loss  She has been diagnosed with iron deficiency and takes a supplement.       Food Recall:  Breakfast: Eggs- 1-2 eggs with rye bread  Lunch: Soup or something light-- Usually has chicken or ground beef with the meal  Dinner: Biggest meal-- with protein such as chicken or meat  Typically with rice/veggies.     Lifestyle History:  Exercise: On the weekend-- uses an [employer]        Wt Readings from Last 20 Encounters:   05/09/25 73.3 kg (161 lb 9.6 oz)   01/17/25 71.5 kg (157 lb 9.6 oz)   10/24/24 75.7 kg  "(166 lb 14.2 oz)   10/11/24 75.2 kg (165 lb 12.8 oz)   05/09/24 85.5 kg (188 lb 9.6 oz)   03/27/24 89.4 kg (197 lb 3.2 oz)   08/07/21 78.6 kg (173 lb 4.5 oz)   04/18/17 87.1 kg (192 lb 2.1 oz)   01/17/17 84.9 kg (187 lb 3 oz)   07/23/15 73.9 kg (163 lb)        Review of Systems    History reviewed. No pertinent past medical history.    Past Surgical History:   Procedure Laterality Date   • AUGMENTATION BREAST     • STRABISMUS SURGERY          No Known Allergies     Objective:    /74   Pulse 99   Temp 97.6 °F (36.4 °C)   Resp 16   Ht 5' 8\" (1.727 m)   Wt 73.3 kg (161 lb 9.6 oz)   BMI 24.57 kg/m²     Physical Exam:  Constitutional:  she  appears well-developed and well-nourished. No distress.   HENT:   Head: Normocephalic and atraumatic.   Neck: Normal range of motion.   Pulmonary/Chest: Effort normal.   Musculoskeletal: Normal range of motion.   Neurological: she  is alert and oriented to person, place, and time.   Skin: she  is not diaphoretic.   Psychiatric: she  has a normal mood and affect. her  behavior is normal.        LABS:    No results found for: \"HGBA1C\"   Lab Results   Component Value Date    SODIUM 139 12/07/2024    K 4.0 12/07/2024     12/07/2024    CO2 28 12/07/2024    ANIONGAP 13 11/26/2015    AGAP 7 12/07/2024    BUN 11 12/07/2024    CREATININE 0.72 12/07/2024    GLUC 80 12/07/2024    CALCIUM 8.7 12/07/2024    AST 16 12/07/2024    ALT 16 12/07/2024    ALKPHOS 29 (L) 12/07/2024    TP 6.1 (L) 12/07/2024    TBILI 0.4 12/07/2024    EGFR 104 12/07/2024 12/07/2024   Lab Results   Component Value Date    TSH 2.35 03/06/2025      "

## 2025-05-09 NOTE — ASSESSMENT & PLAN NOTE
Continue Wegovy 1.7mg weekly to maintain current Weight  Currently paying Cash for medication- discussed option of [facility] pharmacy if cost increases  Discussed option(Not FDA approved) to increasing interval of dosingevery 10-14 days of tolerating and does not cause increase in appetite, side effects, or weight gain  Continue Current Nutrition plan with protein at each meal  Continue regular exercise-- consider joining [employer]

## 2025-07-03 DIAGNOSIS — E78.2 MODERATE MIXED HYPERLIPIDEMIA NOT REQUIRING STATIN THERAPY: ICD-10-CM

## 2025-07-03 DIAGNOSIS — E66.3 OVERWEIGHT: ICD-10-CM

## 2025-07-03 NOTE — TELEPHONE ENCOUNTER
Patient called into the refill line to see the status of her medication. I informed her that we sent it to the office and are awaiting approval from the provider. Patient states that [place] and needs to be able to pick this medication up as soon as possible.

## 2025-07-03 NOTE — TELEPHONE ENCOUNTER
*INCREASE REQUESTED*  Pt states she has noticed slow weight gain so she feels she needs an increase to lose weight again    Reason for call:   [x] Refill   [] Prior Auth  [] Other:     Office:   [] PCP/Provider -   [x] Specialty/Provider - [facility] / Elisa Izaguirre PA-C     Medication:   ~ Semaglutide-Weight Management (WEGOVY) 1.7 MG/0.75ML - Inject 0.75 mL (1.7 mg total) under the skin once a week     Pharmacy:   [facility] [place] - [id number] [facility] [address]   Does the patient have enough for 3 days?   [] Yes   [x] No - Send as HP to POD    How are you tolerating the medication?   [] Nausea  [] Vomiting  [] Diarrhea  [x] Asymptomatic  [] Other:    Last visit weight: 161 lb 9.6 oz (5/9)    Current weight: 165 lb    Date of last injection: 6/29    How many injections do you have left: 0

## 2025-07-07 DIAGNOSIS — E66.3 OVERWEIGHT: ICD-10-CM

## 2025-07-07 DIAGNOSIS — E78.2 MODERATE MIXED HYPERLIPIDEMIA NOT REQUIRING STATIN THERAPY: ICD-10-CM

## 2025-07-07 NOTE — TELEPHONE ENCOUNTER
Lmsg for pt to call back if wants sooner appt than November appt to discuss does increase or if interested in seeing rd to call and make appt and also 1.7 mg dose has been approved

## 2025-07-07 NOTE — TELEPHONE ENCOUNTER
BMI normal at last visit, suggest continuing 1.7mg dose for now- RX approved.   Will refer to RD if she is interested, focus on strength training as discussed at visit  Discuss dose increase if needed at next visit, can follow up sooner than November if needed.

## 2025-08-17 DIAGNOSIS — E78.2 MODERATE MIXED HYPERLIPIDEMIA NOT REQUIRING STATIN THERAPY: ICD-10-CM

## 2025-08-17 DIAGNOSIS — E66.3 OVERWEIGHT: ICD-10-CM
